# Patient Record
Sex: FEMALE | Race: WHITE | NOT HISPANIC OR LATINO
[De-identification: names, ages, dates, MRNs, and addresses within clinical notes are randomized per-mention and may not be internally consistent; named-entity substitution may affect disease eponyms.]

---

## 2017-08-03 ENCOUNTER — RX ONLY (RX ONLY)
Age: 42
End: 2017-08-03

## 2017-08-03 RX ORDER — CLOBETASOL PROPIONATE 0.5 MG/ML
APPLY SOLUTION TOPICAL TWICE A DAY
Qty: 1 | Refills: 3 | Status: CANCELLED
Stop reason: CLARIF

## 2017-08-29 ENCOUNTER — APPOINTMENT (OUTPATIENT)
Dept: URBAN - METROPOLITAN AREA CLINIC 219 | Age: 42
Setting detail: DERMATOLOGY
End: 2017-08-29

## 2017-08-29 DIAGNOSIS — L81.1 CHLOASMA: ICD-10-CM

## 2017-08-29 DIAGNOSIS — L738 OTHER SPECIFIED DISEASES OF HAIR AND HAIR FOLLICLES: ICD-10-CM

## 2017-08-29 DIAGNOSIS — L663 OTHER SPECIFIED DISEASES OF HAIR AND HAIR FOLLICLES: ICD-10-CM

## 2017-08-29 DIAGNOSIS — D22 MELANOCYTIC NEVI: ICD-10-CM

## 2017-08-29 DIAGNOSIS — L21.8 OTHER SEBORRHEIC DERMATITIS: ICD-10-CM

## 2017-08-29 DIAGNOSIS — L82.1 OTHER SEBORRHEIC KERATOSIS: ICD-10-CM

## 2017-08-29 PROBLEM — L02.02 FURUNCLE OF FACE: Status: ACTIVE | Noted: 2017-08-29

## 2017-08-29 PROBLEM — D23.39 OTHER BENIGN NEOPLASM OF SKIN OF OTHER PARTS OF FACE: Status: ACTIVE | Noted: 2017-08-29

## 2017-08-29 PROBLEM — L02.222 FURUNCLE OF BACK [ANY PART, EXCEPT BUTTOCK]: Status: ACTIVE | Noted: 2017-08-29

## 2017-08-29 PROBLEM — D22.5 MELANOCYTIC NEVI OF TRUNK: Status: ACTIVE | Noted: 2017-08-29

## 2017-08-29 PROCEDURE — 99214 OFFICE O/P EST MOD 30 MIN: CPT

## 2017-08-29 PROCEDURE — OTHER OBSERVATION AND MEASURE: OTHER

## 2017-08-29 PROCEDURE — OTHER PRESCRIPTION: OTHER

## 2017-08-29 PROCEDURE — OTHER TREATMENT REGIMEN: OTHER

## 2017-08-29 PROCEDURE — OTHER OBSERVATION: OTHER

## 2017-08-29 PROCEDURE — OTHER REASSURANCE: OTHER

## 2017-08-29 PROCEDURE — OTHER COUNSELING: OTHER

## 2017-08-29 RX ORDER — FLUOCINOLONE ACETONIDE, HYDROQUINONE, AND TRETINOIN .1; 40; .5 MG/G; MG/G; MG/G
APPLY CREAM TOPICAL AT NIGHT
Qty: 1 | Refills: 2 | Status: ERX

## 2017-08-29 RX ORDER — CICLOPIROX 7.7 MG/ML
APPLY SUSPENSION TOPICAL
Qty: 1 | Refills: 3 | Status: CANCELLED
Stop reason: ENTERED-IN-ERROR

## 2017-08-29 ASSESSMENT — LOCATION SIMPLE DESCRIPTION DERM
LOCATION SIMPLE: CHEST
LOCATION SIMPLE: LEFT LIP
LOCATION SIMPLE: UPPER BACK
LOCATION SIMPLE: LEFT CHEEK
LOCATION SIMPLE: LEFT FOREHEAD
LOCATION SIMPLE: RIGHT UPPER BACK
LOCATION SIMPLE: RIGHT CHEEK

## 2017-08-29 ASSESSMENT — LOCATION ZONE DERM
LOCATION ZONE: TRUNK
LOCATION ZONE: FACE
LOCATION ZONE: LIP

## 2017-08-29 ASSESSMENT — LOCATION DETAILED DESCRIPTION DERM
LOCATION DETAILED: SUPERIOR THORACIC SPINE
LOCATION DETAILED: LEFT MEDIAL SUPERIOR CHEST
LOCATION DETAILED: LEFT UPPER CUTANEOUS LIP
LOCATION DETAILED: RIGHT INFERIOR CENTRAL MALAR CHEEK
LOCATION DETAILED: LEFT SUPERIOR FOREHEAD
LOCATION DETAILED: LEFT INFERIOR CENTRAL MALAR CHEEK
LOCATION DETAILED: RIGHT SUPERIOR UPPER BACK

## 2017-08-29 NOTE — PROCEDURE: TREATMENT REGIMEN
Plan: Tri-manju apply at night as tolerated (2 month cycles)\\nSuncreen daily (SPF 30+)\\nCounseled re risk of ochronosis with excessive use of Tri-Manju
Continue Regimen: Clobetasol solution two times a day as needed for scalp irritation\\nDesonide lotion twice a day as needed for flares \\nciclopirox TS twice a day (maintenance)
Detail Level: Detailed
Detail Level: Zone
Continue Regimen: Hibiclens soap (avoid eyes/ears) once a day as needed for pustules\\nCiclopirox TS twice a day as needed

## 2017-08-29 NOTE — PROCEDURE: OBSERVATION
Size Of Lesion: 1.5 mm
Body Location Override (Optional - Billing Will Still Be Based On Selected Body Map Location If Applicable): right nose
Detail Level: Detailed

## 2018-01-25 ENCOUNTER — APPOINTMENT (OUTPATIENT)
Dept: URBAN - METROPOLITAN AREA CLINIC 219 | Age: 43
Setting detail: DERMATOLOGY
End: 2018-01-25

## 2018-01-25 DIAGNOSIS — L72.0 EPIDERMAL CYST: ICD-10-CM

## 2018-01-25 DIAGNOSIS — L738 OTHER SPECIFIED DISEASES OF HAIR AND HAIR FOLLICLES: ICD-10-CM

## 2018-01-25 DIAGNOSIS — L21.8 OTHER SEBORRHEIC DERMATITIS: ICD-10-CM

## 2018-01-25 DIAGNOSIS — L663 OTHER SPECIFIED DISEASES OF HAIR AND HAIR FOLLICLES: ICD-10-CM

## 2018-01-25 PROBLEM — F41.9 ANXIETY DISORDER, UNSPECIFIED: Status: ACTIVE | Noted: 2018-01-25

## 2018-01-25 PROBLEM — L02.222 FURUNCLE OF BACK [ANY PART, EXCEPT BUTTOCK]: Status: ACTIVE | Noted: 2018-01-25

## 2018-01-25 PROBLEM — L02.02 FURUNCLE OF FACE: Status: ACTIVE | Noted: 2018-01-25

## 2018-01-25 PROBLEM — L29.8 OTHER PRURITUS: Status: ACTIVE | Noted: 2018-01-25

## 2018-01-25 PROBLEM — D23.39 OTHER BENIGN NEOPLASM OF SKIN OF OTHER PARTS OF FACE: Status: ACTIVE | Noted: 2018-01-25

## 2018-01-25 PROBLEM — E78.5 HYPERLIPIDEMIA, UNSPECIFIED: Status: ACTIVE | Noted: 2018-01-25

## 2018-01-25 PROCEDURE — 99213 OFFICE O/P EST LOW 20 MIN: CPT

## 2018-01-25 PROCEDURE — OTHER OBSERVATION: OTHER

## 2018-01-25 PROCEDURE — OTHER MIPS QUALITY: OTHER

## 2018-01-25 PROCEDURE — OTHER TREATMENT REGIMEN: OTHER

## 2018-01-25 PROCEDURE — OTHER PRESCRIPTION: OTHER

## 2018-01-25 PROCEDURE — OTHER OBSERVATION AND MEASURE: OTHER

## 2018-01-25 RX ORDER — CLOBETASOL PROPIONATE 0.5 MG/ML
APPLY SOLUTION TOPICAL
Qty: 1 | Refills: 3 | Status: ERX | COMMUNITY
Start: 2018-01-25

## 2018-01-25 ASSESSMENT — LOCATION ZONE DERM
LOCATION ZONE: TRUNK
LOCATION ZONE: FACE
LOCATION ZONE: EYELID

## 2018-01-25 ASSESSMENT — LOCATION SIMPLE DESCRIPTION DERM
LOCATION SIMPLE: UPPER BACK
LOCATION SIMPLE: LEFT EYEBROW
LOCATION SIMPLE: LEFT EYELID
LOCATION SIMPLE: RIGHT EYEBROW
LOCATION SIMPLE: CHEST

## 2018-01-25 ASSESSMENT — LOCATION DETAILED DESCRIPTION DERM
LOCATION DETAILED: LEFT LATERAL CANTHUS
LOCATION DETAILED: LEFT MEDIAL EYEBROW
LOCATION DETAILED: RIGHT LATERAL EYEBROW
LOCATION DETAILED: SUPERIOR THORACIC SPINE
LOCATION DETAILED: LEFT MEDIAL SUPERIOR CHEST

## 2018-01-25 NOTE — PROCEDURE: TREATMENT REGIMEN
Plan: Tretinoin 0.05% cream or Differin 0.1% gel at night as tolerated (spot treat - counseled that treatment will be drying)\\nPlan acne surgery if lesions enlarge or become symptomatic
Detail Level: Simple
Continue Regimen: Hibiclens soap (avoid eyes/ears) once a day as needed for pustules\\nCiclopirox TS twice a day as needed
Detail Level: Zone
Continue Regimen: Clobetasol solution two times a day as needed for scalp irritation\\nDesonide lotion twice a day as needed for flares \\nciclopirox TS twice a day (maintenance)
Detail Level: Detailed

## 2018-01-25 NOTE — PROCEDURE: OBSERVATION
Detail Level: Detailed
Size Of Lesion: 1.5 mm
Body Location Override (Optional - Billing Will Still Be Based On Selected Body Map Location If Applicable): right nose

## 2018-09-12 ENCOUNTER — APPOINTMENT (OUTPATIENT)
Dept: URBAN - METROPOLITAN AREA CLINIC 219 | Age: 43
Setting detail: DERMATOLOGY
End: 2018-09-12

## 2018-09-12 DIAGNOSIS — L738 OTHER SPECIFIED DISEASES OF HAIR AND HAIR FOLLICLES: ICD-10-CM

## 2018-09-12 DIAGNOSIS — L663 OTHER SPECIFIED DISEASES OF HAIR AND HAIR FOLLICLES: ICD-10-CM

## 2018-09-12 DIAGNOSIS — B07.8 OTHER VIRAL WARTS: ICD-10-CM

## 2018-09-12 DIAGNOSIS — L81.4 OTHER MELANIN HYPERPIGMENTATION: ICD-10-CM

## 2018-09-12 DIAGNOSIS — L21.8 OTHER SEBORRHEIC DERMATITIS: ICD-10-CM

## 2018-09-12 PROBLEM — D48.5 NEOPLASM OF UNCERTAIN BEHAVIOR OF SKIN: Status: ACTIVE | Noted: 2018-09-12

## 2018-09-12 PROBLEM — L70.0 ACNE VULGARIS: Status: ACTIVE | Noted: 2018-09-12

## 2018-09-12 PROBLEM — L02.02 FURUNCLE OF FACE: Status: ACTIVE | Noted: 2018-09-12

## 2018-09-12 PROBLEM — D23.39 OTHER BENIGN NEOPLASM OF SKIN OF OTHER PARTS OF FACE: Status: ACTIVE | Noted: 2018-09-12

## 2018-09-12 PROBLEM — L02.222 FURUNCLE OF BACK [ANY PART, EXCEPT BUTTOCK]: Status: ACTIVE | Noted: 2018-09-12

## 2018-09-12 PROBLEM — E05.90 THYROTOXICOSIS, UNSPECIFIED WITHOUT THYROTOXIC CRISIS OR STORM: Status: ACTIVE | Noted: 2018-09-12

## 2018-09-12 PROCEDURE — 99213 OFFICE O/P EST LOW 20 MIN: CPT | Mod: 25

## 2018-09-12 PROCEDURE — OTHER TREATMENT REGIMEN: OTHER

## 2018-09-12 PROCEDURE — 17110 DESTRUCT B9 LESION 1-14: CPT

## 2018-09-12 PROCEDURE — OTHER OBSERVATION: OTHER

## 2018-09-12 PROCEDURE — OTHER MIPS QUALITY: OTHER

## 2018-09-12 PROCEDURE — OTHER OBSERVATION AND MEASURE: OTHER

## 2018-09-12 PROCEDURE — OTHER LIQUID NITROGEN: OTHER

## 2018-09-12 PROCEDURE — OTHER PRESCRIPTION: OTHER

## 2018-09-12 RX ORDER — CLOBETASOL PROPIONATE 0.5 MG/ML
APPLY SOLUTION TOPICAL
Qty: 1 | Refills: 3 | Status: ERX

## 2018-09-12 ASSESSMENT — LOCATION ZONE DERM
LOCATION ZONE: FACE
LOCATION ZONE: TRUNK

## 2018-09-12 ASSESSMENT — LOCATION SIMPLE DESCRIPTION DERM
LOCATION SIMPLE: LEFT FOREHEAD
LOCATION SIMPLE: UPPER BACK
LOCATION SIMPLE: LEFT CHEEK
LOCATION SIMPLE: CHEST

## 2018-09-12 ASSESSMENT — LOCATION DETAILED DESCRIPTION DERM
LOCATION DETAILED: LEFT SUPERIOR FOREHEAD
LOCATION DETAILED: LEFT MEDIAL SUPERIOR CHEST
LOCATION DETAILED: SUPERIOR THORACIC SPINE
LOCATION DETAILED: LEFT CENTRAL MALAR CHEEK

## 2018-09-12 NOTE — PROCEDURE: TREATMENT REGIMEN
Continue Regimen: Clobetasol solution two times a day as needed for scalp irritation\\nDesonide lotion twice a day as needed for flares \\nciclopirox TS twice a day (maintenance)
Continue Regimen: Hibiclens soap (avoid eyes/ears) once a day as needed for pustules\\nCiclopirox TS twice a day as needed
Detail Level: Simple
Detail Level: Detailed
Detail Level: Zone
Plan: Tretinoin cream 0.025% at night as tolerated

## 2018-09-12 NOTE — PROCEDURE: LIQUID NITROGEN
Include Z78.9 (Other Specified Conditions Influencing Health Status) As An Associated Diagnosis?: No
Post-Care Instructions: I reviewed with the patient in detail post-care instructions. Patient is to wear sunprotection, and avoid picking at any of the treated lesions. Pt may apply Vaseline to crusted or scabbing areas.
Consent: The patient's consent was obtained including but not limited to risks of crusting, scabbing, blistering, scarring, darker or lighter pigmentary change, recurrence, incomplete removal and infection.
Medical Necessity Clause: This procedure was medically necessary because the lesions that were treated were:
Medical Necessity Information: It is in your best interest to select a reason for this procedure from the list below. All of these items fulfill various CMS LCD requirements except the new and changing color options.
Detail Level: Detailed
Number Of Freeze-Thaw Cycles: 1 freeze-thaw cycle

## 2018-09-12 NOTE — PROCEDURE: OBSERVATION
Body Location Override (Optional - Billing Will Still Be Based On Selected Body Map Location If Applicable): right nose
Detail Level: Detailed
Size Of Lesion: 1.5 mm

## 2018-12-12 ENCOUNTER — APPOINTMENT (OUTPATIENT)
Dept: URBAN - METROPOLITAN AREA CLINIC 219 | Age: 43
Setting detail: DERMATOLOGY
End: 2018-12-12

## 2018-12-12 DIAGNOSIS — L81.4 OTHER MELANIN HYPERPIGMENTATION: ICD-10-CM

## 2018-12-12 DIAGNOSIS — L663 OTHER SPECIFIED DISEASES OF HAIR AND HAIR FOLLICLES: ICD-10-CM

## 2018-12-12 DIAGNOSIS — L21.8 OTHER SEBORRHEIC DERMATITIS: ICD-10-CM

## 2018-12-12 DIAGNOSIS — L738 OTHER SPECIFIED DISEASES OF HAIR AND HAIR FOLLICLES: ICD-10-CM

## 2018-12-12 PROBLEM — D23.39 OTHER BENIGN NEOPLASM OF SKIN OF OTHER PARTS OF FACE: Status: ACTIVE | Noted: 2018-12-12

## 2018-12-12 PROBLEM — L70.0 ACNE VULGARIS: Status: ACTIVE | Noted: 2018-12-12

## 2018-12-12 PROBLEM — L02.02 FURUNCLE OF FACE: Status: ACTIVE | Noted: 2018-12-12

## 2018-12-12 PROBLEM — L02.222 FURUNCLE OF BACK [ANY PART, EXCEPT BUTTOCK]: Status: ACTIVE | Noted: 2018-12-12

## 2018-12-12 PROCEDURE — OTHER REASSURANCE: OTHER

## 2018-12-12 PROCEDURE — OTHER OBSERVATION AND MEASURE: OTHER

## 2018-12-12 PROCEDURE — OTHER MIPS QUALITY: OTHER

## 2018-12-12 PROCEDURE — 99213 OFFICE O/P EST LOW 20 MIN: CPT

## 2018-12-12 PROCEDURE — OTHER TREATMENT REGIMEN: OTHER

## 2018-12-12 PROCEDURE — OTHER OBSERVATION: OTHER

## 2018-12-12 PROCEDURE — OTHER COUNSELING: OTHER

## 2018-12-12 ASSESSMENT — LOCATION ZONE DERM
LOCATION ZONE: TRUNK
LOCATION ZONE: FACE

## 2018-12-12 ASSESSMENT — LOCATION DETAILED DESCRIPTION DERM
LOCATION DETAILED: SUPERIOR THORACIC SPINE
LOCATION DETAILED: LEFT MEDIAL SUPERIOR CHEST
LOCATION DETAILED: LEFT SUPERIOR CENTRAL MALAR CHEEK

## 2018-12-12 ASSESSMENT — LOCATION SIMPLE DESCRIPTION DERM
LOCATION SIMPLE: CHEST
LOCATION SIMPLE: LEFT CHEEK
LOCATION SIMPLE: UPPER BACK

## 2018-12-12 NOTE — PROCEDURE: TREATMENT REGIMEN
Plan: Recommend patient to discuss IPL treatment with plastic surgeons office in [place] if worsens Plan: Recommend patient to discuss IPL treatment with plastic surgeons office in [place] if worsens

## 2018-12-12 NOTE — PROCEDURE: OBSERVATION
Detail Level: Detailed
Body Location Override (Optional - Billing Will Still Be Based On Selected Body Map Location If Applicable): right nose
Size Of Lesion: 2 mm
Size Of Lesion: 1.5 mm

## 2018-12-12 NOTE — PROCEDURE: TREATMENT REGIMEN
Continue Regimen: Clobetasol solution two times a day as needed for scalp irritation\\nDesonide lotion twice a day as needed for scale

## 2018-12-12 NOTE — PROCEDURE: TREATMENT REGIMEN
Continue Regimen: Hibiclens soap (avoid eyes/ears) once a day as needed for pustules\\nCiclopirox TS twice a day as needed

## 2019-06-12 ENCOUNTER — APPOINTMENT (OUTPATIENT)
Dept: URBAN - METROPOLITAN AREA CLINIC 219 | Age: 44
Setting detail: DERMATOLOGY
End: 2019-06-12

## 2019-06-12 DIAGNOSIS — L738 OTHER SPECIFIED DISEASES OF HAIR AND HAIR FOLLICLES: ICD-10-CM

## 2019-06-12 DIAGNOSIS — L663 OTHER SPECIFIED DISEASES OF HAIR AND HAIR FOLLICLES: ICD-10-CM

## 2019-06-12 DIAGNOSIS — L21.8 OTHER SEBORRHEIC DERMATITIS: ICD-10-CM

## 2019-06-12 DIAGNOSIS — D22 MELANOCYTIC NEVI: ICD-10-CM

## 2019-06-12 PROBLEM — D23.39 OTHER BENIGN NEOPLASM OF SKIN OF OTHER PARTS OF FACE: Status: ACTIVE | Noted: 2019-06-12

## 2019-06-12 PROBLEM — E05.90 THYROTOXICOSIS, UNSPECIFIED WITHOUT THYROTOXIC CRISIS OR STORM: Status: ACTIVE | Noted: 2019-06-12

## 2019-06-12 PROBLEM — L02.222 FURUNCLE OF BACK [ANY PART, EXCEPT BUTTOCK]: Status: ACTIVE | Noted: 2019-06-12

## 2019-06-12 PROBLEM — D22.5 MELANOCYTIC NEVI OF TRUNK: Status: ACTIVE | Noted: 2019-06-12

## 2019-06-12 PROBLEM — F41.9 ANXIETY DISORDER, UNSPECIFIED: Status: ACTIVE | Noted: 2019-06-12

## 2019-06-12 PROBLEM — L02.02 FURUNCLE OF FACE: Status: ACTIVE | Noted: 2019-06-12

## 2019-06-12 PROBLEM — D48.5 NEOPLASM OF UNCERTAIN BEHAVIOR OF SKIN: Status: ACTIVE | Noted: 2019-06-12

## 2019-06-12 PROBLEM — L29.8 OTHER PRURITUS: Status: ACTIVE | Noted: 2019-06-12

## 2019-06-12 PROCEDURE — OTHER PRESCRIPTION: OTHER

## 2019-06-12 PROCEDURE — OTHER REASSURANCE: OTHER

## 2019-06-12 PROCEDURE — OTHER COUNSELING: OTHER

## 2019-06-12 PROCEDURE — 99213 OFFICE O/P EST LOW 20 MIN: CPT

## 2019-06-12 PROCEDURE — OTHER TREATMENT REGIMEN: OTHER

## 2019-06-12 PROCEDURE — OTHER OBSERVATION: OTHER

## 2019-06-12 PROCEDURE — OTHER OBSERVATION AND MEASURE: OTHER

## 2019-06-12 RX ORDER — CLOBETASOL PROPIONATE 0.5 MG/ML
APPLY SOLUTION TOPICAL
Qty: 1 | Refills: 3 | Status: ERX

## 2019-06-12 ASSESSMENT — LOCATION SIMPLE DESCRIPTION DERM
LOCATION SIMPLE: UPPER BACK
LOCATION SIMPLE: RIGHT ANKLE
LOCATION SIMPLE: CHEST

## 2019-06-12 ASSESSMENT — LOCATION ZONE DERM
LOCATION ZONE: TRUNK
LOCATION ZONE: LEG

## 2019-06-12 ASSESSMENT — LOCATION DETAILED DESCRIPTION DERM
LOCATION DETAILED: SUPERIOR THORACIC SPINE
LOCATION DETAILED: RIGHT POSTERIOR ANKLE
LOCATION DETAILED: RIGHT MEDIAL SUPERIOR CHEST
LOCATION DETAILED: LEFT MEDIAL SUPERIOR CHEST

## 2019-06-12 NOTE — PROCEDURE: TREATMENT REGIMEN
Detail Level: Zone
Continue Regimen: Hibiclens soap (avoid eyes/ears) once a day as needed for pustules\\nCiclopirox TS twice a day as needed
Continue Regimen: Clobetasol solution two times a day as needed for scalp irritation\\nDesonide lotion twice a day as needed for scale
Detail Level: Detailed

## 2019-06-12 NOTE — PROCEDURE: OBSERVATION
Detail Level: Detailed
Size Of Lesion: 2 mm
Size Of Lesion: 1.5 mm
Body Location Override (Optional - Billing Will Still Be Based On Selected Body Map Location If Applicable): right nose

## 2019-06-18 ENCOUNTER — RX ONLY (RX ONLY)
Age: 44
End: 2019-06-18

## 2019-06-18 RX ORDER — TRETINOIN 1 MG/G
APPLY CREAM TOPICAL
Qty: 1 | Refills: 3 | Status: ERX | COMMUNITY
Start: 2019-06-18

## 2019-12-18 ENCOUNTER — APPOINTMENT (OUTPATIENT)
Dept: URBAN - METROPOLITAN AREA CLINIC 219 | Age: 44
Setting detail: DERMATOLOGY
End: 2019-12-18

## 2019-12-18 DIAGNOSIS — D22 MELANOCYTIC NEVI: ICD-10-CM

## 2019-12-18 DIAGNOSIS — L663 OTHER SPECIFIED DISEASES OF HAIR AND HAIR FOLLICLES: ICD-10-CM

## 2019-12-18 DIAGNOSIS — L72.11 PILAR CYST: ICD-10-CM

## 2019-12-18 DIAGNOSIS — L21.8 OTHER SEBORRHEIC DERMATITIS: ICD-10-CM

## 2019-12-18 DIAGNOSIS — L738 OTHER SPECIFIED DISEASES OF HAIR AND HAIR FOLLICLES: ICD-10-CM

## 2019-12-18 PROBLEM — F41.9 ANXIETY DISORDER, UNSPECIFIED: Status: ACTIVE | Noted: 2019-12-18

## 2019-12-18 PROBLEM — L02.02 FURUNCLE OF FACE: Status: ACTIVE | Noted: 2019-12-18

## 2019-12-18 PROBLEM — E05.90 THYROTOXICOSIS, UNSPECIFIED WITHOUT THYROTOXIC CRISIS OR STORM: Status: ACTIVE | Noted: 2019-12-18

## 2019-12-18 PROBLEM — D23.39 OTHER BENIGN NEOPLASM OF SKIN OF OTHER PARTS OF FACE: Status: ACTIVE | Noted: 2019-12-18

## 2019-12-18 PROBLEM — L02.222 FURUNCLE OF BACK [ANY PART, EXCEPT BUTTOCK]: Status: ACTIVE | Noted: 2019-12-18

## 2019-12-18 PROBLEM — D48.5 NEOPLASM OF UNCERTAIN BEHAVIOR OF SKIN: Status: ACTIVE | Noted: 2019-12-18

## 2019-12-18 PROBLEM — D22.5 MELANOCYTIC NEVI OF TRUNK: Status: ACTIVE | Noted: 2019-12-18

## 2019-12-18 PROCEDURE — OTHER TREATMENT REGIMEN: OTHER

## 2019-12-18 PROCEDURE — OTHER OBSERVATION: OTHER

## 2019-12-18 PROCEDURE — OTHER OBSERVATION AND MEASURE: OTHER

## 2019-12-18 PROCEDURE — OTHER REASSURANCE: OTHER

## 2019-12-18 PROCEDURE — OTHER PRESCRIPTION: OTHER

## 2019-12-18 PROCEDURE — 99214 OFFICE O/P EST MOD 30 MIN: CPT

## 2019-12-18 PROCEDURE — OTHER MIPS QUALITY: OTHER

## 2019-12-18 PROCEDURE — OTHER DEFER: OTHER

## 2019-12-18 PROCEDURE — OTHER COUNSELING: OTHER

## 2019-12-18 RX ORDER — HYDROCORTISONE BUTYRATE 1 MG/ML
APPLY SOLUTION TOPICAL PRN
Qty: 1 | Refills: 3 | Status: ERX | COMMUNITY
Start: 2019-12-18

## 2019-12-18 ASSESSMENT — LOCATION SIMPLE DESCRIPTION DERM
LOCATION SIMPLE: SCALP
LOCATION SIMPLE: UPPER BACK
LOCATION SIMPLE: CHEST

## 2019-12-18 ASSESSMENT — LOCATION ZONE DERM
LOCATION ZONE: SCALP
LOCATION ZONE: TRUNK

## 2019-12-18 ASSESSMENT — LOCATION DETAILED DESCRIPTION DERM
LOCATION DETAILED: LEFT MEDIAL SUPERIOR CHEST
LOCATION DETAILED: RIGHT MEDIAL SUPERIOR CHEST
LOCATION DETAILED: SUPERIOR THORACIC SPINE
LOCATION DETAILED: RIGHT SUPERIOR PARIETAL SCALP

## 2019-12-18 NOTE — PROCEDURE: OBSERVATION
Detail Level: Detailed
Size Of Lesion: 5 mm
Body Location Override (Optional - Billing Will Still Be Based On Selected Body Map Location If Applicable): right scalp

## 2019-12-18 NOTE — PROCEDURE: TREATMENT REGIMEN
Continue Regimen: Hibiclens soap (avoid eyes/ears) once a day as needed for pustules\\nCiclopirox TS twice a day as needed
Continue Regimen: Desonide lotion twice a day as needed for scale\\nClobetasol solution twice a day as needed for irritation in posterior scalp
Detail Level: Detailed
Detail Level: Zone
Modify Regimen: hydrocortisone butyrate 0.1 % topical solution : Apply to irritation at hairline as needed

## 2021-11-05 ENCOUNTER — OFFICE VISIT (OUTPATIENT)
Dept: URBAN - NONMETROPOLITAN AREA CLINIC 13 | Facility: CLINIC | Age: 46
End: 2021-11-05